# Patient Record
Sex: MALE | Race: WHITE | NOT HISPANIC OR LATINO | Employment: OTHER | URBAN - METROPOLITAN AREA
[De-identification: names, ages, dates, MRNs, and addresses within clinical notes are randomized per-mention and may not be internally consistent; named-entity substitution may affect disease eponyms.]

---

## 2018-09-10 ENCOUNTER — OFFICE VISIT (OUTPATIENT)
Dept: NEUROLOGY | Facility: CLINIC | Age: 63
End: 2018-09-10
Payer: COMMERCIAL

## 2018-09-10 VITALS
BODY MASS INDEX: 39.9 KG/M2 | DIASTOLIC BLOOD PRESSURE: 80 MMHG | RESPIRATION RATE: 20 BRPM | WEIGHT: 294.56 LBS | SYSTOLIC BLOOD PRESSURE: 166 MMHG | HEART RATE: 72 BPM | HEIGHT: 72 IN

## 2018-09-10 DIAGNOSIS — R20.2 NUMBNESS AND TINGLING OF BOTH FEET: ICD-10-CM

## 2018-09-10 DIAGNOSIS — R20.2 NUMBNESS AND TINGLING OF LEFT LEG: Primary | ICD-10-CM

## 2018-09-10 DIAGNOSIS — R20.0 NUMBNESS AND TINGLING IN BOTH HANDS: ICD-10-CM

## 2018-09-10 DIAGNOSIS — R20.2 NUMBNESS AND TINGLING IN BOTH HANDS: ICD-10-CM

## 2018-09-10 DIAGNOSIS — R20.0 NUMBNESS AND TINGLING OF BOTH FEET: ICD-10-CM

## 2018-09-10 DIAGNOSIS — R20.0 NUMBNESS AND TINGLING OF LEFT LEG: Primary | ICD-10-CM

## 2018-09-10 PROCEDURE — 99204 OFFICE O/P NEW MOD 45 MIN: CPT | Mod: S$GLB,,, | Performed by: PSYCHIATRY & NEUROLOGY

## 2018-09-10 PROCEDURE — 99999 PR PBB SHADOW E&M-NEW PATIENT-LVL III: CPT | Mod: PBBFAC,,, | Performed by: PSYCHIATRY & NEUROLOGY

## 2018-09-10 PROCEDURE — 3008F BODY MASS INDEX DOCD: CPT | Mod: CPTII,S$GLB,, | Performed by: PSYCHIATRY & NEUROLOGY

## 2018-09-10 PROCEDURE — 3077F SYST BP >= 140 MM HG: CPT | Mod: CPTII,S$GLB,, | Performed by: PSYCHIATRY & NEUROLOGY

## 2018-09-10 PROCEDURE — 3079F DIAST BP 80-89 MM HG: CPT | Mod: CPTII,S$GLB,, | Performed by: PSYCHIATRY & NEUROLOGY

## 2018-09-10 RX ORDER — FUROSEMIDE 40 MG/1
40 TABLET ORAL DAILY
Refills: 5 | COMMUNITY
Start: 2018-08-14

## 2018-09-10 RX ORDER — MELOXICAM 15 MG/1
TABLET ORAL
Refills: 3 | COMMUNITY
Start: 2018-08-22 | End: 2020-06-01

## 2018-09-10 RX ORDER — CANDESARTAN 8 MG/1
8 TABLET ORAL DAILY
Refills: 5 | COMMUNITY
Start: 2018-08-29

## 2018-09-10 RX ORDER — ALPRAZOLAM 0.5 MG/1
0.5 TABLET ORAL 2 TIMES DAILY
Refills: 2 | COMMUNITY
Start: 2018-08-06

## 2018-09-10 RX ORDER — AMOXICILLIN 500 MG/1
CAPSULE ORAL
Refills: 5 | COMMUNITY
Start: 2018-08-04 | End: 2018-09-10

## 2018-09-10 RX ORDER — DICLOFENAC SODIUM 10 MG/G
GEL TOPICAL
Refills: 5 | COMMUNITY
Start: 2018-08-14

## 2018-09-10 RX ORDER — HYDROCODONE BITARTRATE AND ACETAMINOPHEN 5; 325 MG/1; MG/1
1 TABLET ORAL EVERY 6 HOURS PRN
Refills: 0 | COMMUNITY
Start: 2018-08-23

## 2018-09-10 RX ORDER — ROSUVASTATIN CALCIUM 20 MG/1
TABLET, COATED ORAL
COMMUNITY
Start: 2012-04-11 | End: 2018-09-10

## 2018-09-10 RX ORDER — ASPIRIN 81 MG/1
81 TABLET ORAL DAILY
COMMUNITY
Start: 2012-04-11

## 2018-09-10 NOTE — PROGRESS NOTES
Consult from  ALLYSON Bowles    HPI: Killian Oleary II is a 63 y.o. male referred for numbness in the left thigh which started about 2 months ago. Symptoms are more in the anterior lateral thigh.   He has no back pain. He has this numbness with sitting or standing. This sensation is annoying and feels uncomfortable. He does not have any weakness. He wears a belt at the waist  He has also had numbness in toes for the past year at times.   He has numbness in the hands for much longer with sleep in which he feels like he has to move the hands- this is painless.  He has not gained weight this year.   He is severely obese. There is DM in his family (maternal grandmother)  His recent A1C count was 6.0.     He is seeing orthopedist for knee problems which are followed by orthopedist. Gets steroid injections with orthopedist PRN.     He does no drink alcohol and does not have a family history of neuropathy.     Review of Systems   Constitutional: Negative for fever.   HENT: Negative for nosebleeds.    Eyes: Negative for double vision.   Respiratory: Negative for hemoptysis.    Cardiovascular: Negative for leg swelling.   Gastrointestinal: Negative for blood in stool.   Genitourinary: Negative for hematuria.   Musculoskeletal: Negative for falls.   Skin: Negative for rash.   Psychiatric/Behavioral: The patient does not have insomnia.          I have reviewed all of this patient's past medical and surgical histories as well as family and social histories and active allergies and medications as documented in the electronic medical record.        Exam:  Gen Appearance, well developed/nourished in no apparent distress  CV: 2+ distal pulses with no edema or swelling  Neuro:  MS: Awake, alert, oriented to place, person, time, situation. Sustains attention. Recent/remote memory intact, Language is full to spontaneous speech/repetition/naming/comprehension. Fund of Knowledge is full  CN: Optic discs are flat with normal vasculature,  PERRL, Extraoccular movements and visual fields are full. Normal facial sensation and strength, Hearing symmetric, Tongue and Palate are midline and strong. Shoulder Shrug symmetric and strong.  Motor: Normal bulk, tone, no abnormal movements. 5/5 strength bilateral upper/lower extremities with 2+ reflexes in the arms and 1+ and no clonus  Sensory: symmetric to light touch, pain, temp, and vibration except reduced in the ankles to vibration. Romberg negative  Cerebellar: Finger-nose,Heal-shin, Rapid alternating movements intact  Gait: Normal stance, no ataxia      Labs: A1C 6.0 7/2018      Assessment/Plan: Killian Oleary II is a 63 y.o. male with 2 months of left thigh numbness. He also has one year of numbness of the bilateral toes and longer standing bilateral hand numbness at night.   I recommend:   1. EMG/NCS of the arms and legs to evaluate for Meralgia paraesthetic, polyneuropathy, and CTS  2. Stop using a belt and reduce abnormal obesity advised to see if this helps possible MP symptoms. Symptoms in the thigh are not very painful, but annoying  3. Will obtain Labs from PCP ( B12, ESR, TSH).   4. Note his A1C 6.0. He has a history of impaired fasting glucose. He is getting steroid injections in the knee currently with orthopedist, for unrelated complaint for which he is currently on disability for.   RTC for EMG/NCS  CC: ALLYSON Bowles

## 2018-09-10 NOTE — LETTER
September 10, 2018      Jonh Bowles NP  [address]  [address]  [place]  [place] [address]           [facility]  [address] [facility] [address]  Phone: [phone]  Fax: [phone]          Patient: Killian Oleary II   MR Number: [id number]   YOB: 1955   Date of Visit: 9/10/2018       Dear Jonh Bowles:    Thank you for referring Killian Oleary to me for evaluation. Attached you will find relevant portions of my assessment and plan of care.    If you have questions, please do not hesitate to call me. I look forward to following Killian Oleary along with you.    Sincerely,    Kwadwo Brown MD    Enclosure  CC:  No Recipients    If you would like to receive this communication electronically, please contact [email or web address] or [phone] to request more information on [employer] access.    For providers and/or their staff who would like to refer a patient to [facility], please contact us through our one-stop-shop provider referral line, [facility], at [phone].    If you feel you have received this communication in error or would no longer like to receive these types of communications, please e-mail [email or web address]

## 2018-09-14 ENCOUNTER — LAB VISIT (OUTPATIENT)
Dept: LAB | Facility: HOSPITAL | Age: 63
End: 2018-09-14
Attending: PSYCHIATRY & NEUROLOGY
Payer: COMMERCIAL

## 2018-09-14 ENCOUNTER — PROCEDURE VISIT (OUTPATIENT)
Dept: NEUROLOGY | Facility: CLINIC | Age: 63
End: 2018-09-14
Payer: COMMERCIAL

## 2018-09-14 DIAGNOSIS — R73.03 PREDIABETES: ICD-10-CM

## 2018-09-14 DIAGNOSIS — G62.9 POLYNEUROPATHY: Primary | ICD-10-CM

## 2018-09-14 DIAGNOSIS — R20.0 NUMBNESS AND TINGLING OF LEFT LEG: ICD-10-CM

## 2018-09-14 DIAGNOSIS — R79.89 LOW TESTOSTERONE: ICD-10-CM

## 2018-09-14 DIAGNOSIS — G62.9 POLYNEUROPATHY: ICD-10-CM

## 2018-09-14 DIAGNOSIS — R20.2 NUMBNESS AND TINGLING IN BOTH HANDS: ICD-10-CM

## 2018-09-14 DIAGNOSIS — R20.2 NUMBNESS AND TINGLING OF LEFT LEG: ICD-10-CM

## 2018-09-14 DIAGNOSIS — R20.0 NUMBNESS AND TINGLING OF BOTH FEET: ICD-10-CM

## 2018-09-14 DIAGNOSIS — G57.12 MERALGIA PARAESTHETICA, LEFT: ICD-10-CM

## 2018-09-14 DIAGNOSIS — G56.03 BILATERAL CARPAL TUNNEL SYNDROME: ICD-10-CM

## 2018-09-14 DIAGNOSIS — R20.2 NUMBNESS AND TINGLING OF BOTH FEET: ICD-10-CM

## 2018-09-14 DIAGNOSIS — R20.0 NUMBNESS AND TINGLING IN BOTH HANDS: ICD-10-CM

## 2018-09-14 LAB
ALBUMIN SERPL BCP-MCNC: 3.6 G/DL
ALP SERPL-CCNC: 61 U/L
ALT SERPL W/O P-5'-P-CCNC: 33 U/L
ANION GAP SERPL CALC-SCNC: 10 MMOL/L
AST SERPL-CCNC: 33 U/L
BASOPHILS # BLD AUTO: 0.04 K/UL
BASOPHILS NFR BLD: 0.4 %
BILIRUB SERPL-MCNC: 0.5 MG/DL
BUN SERPL-MCNC: 10 MG/DL
CALCIUM SERPL-MCNC: 9 MG/DL
CHLORIDE SERPL-SCNC: 105 MMOL/L
CO2 SERPL-SCNC: 23 MMOL/L
CREAT SERPL-MCNC: 0.8 MG/DL
DIFFERENTIAL METHOD: ABNORMAL
EOSINOPHIL # BLD AUTO: 0.3 K/UL
EOSINOPHIL NFR BLD: 3.1 %
ERYTHROCYTE [DISTWIDTH] IN BLOOD BY AUTOMATED COUNT: 13.2 %
EST. GFR  (AFRICAN AMERICAN): >60 ML/MIN/1.73 M^2
EST. GFR  (NON AFRICAN AMERICAN): >60 ML/MIN/1.73 M^2
GLUCOSE SERPL-MCNC: 131 MG/DL
HCT VFR BLD AUTO: 44.1 %
HGB BLD-MCNC: 14.6 G/DL
LYMPHOCYTES # BLD AUTO: 2.5 K/UL
LYMPHOCYTES NFR BLD: 28.4 %
MCH RBC QN AUTO: 31.4 PG
MCHC RBC AUTO-ENTMCNC: 33.1 G/DL
MCV RBC AUTO: 95 FL
MONOCYTES # BLD AUTO: 1.2 K/UL
MONOCYTES NFR BLD: 13.4 %
NEUTROPHILS # BLD AUTO: 4.9 K/UL
NEUTROPHILS NFR BLD: 54.7 %
PLATELET # BLD AUTO: 240 K/UL
PMV BLD AUTO: 10.9 FL
POTASSIUM SERPL-SCNC: 3.7 MMOL/L
PROT SERPL-MCNC: 7.7 G/DL
RBC # BLD AUTO: 4.65 M/UL
SODIUM SERPL-SCNC: 138 MMOL/L
WBC # BLD AUTO: 8.93 K/UL

## 2018-09-14 PROCEDURE — 84403 ASSAY OF TOTAL TESTOSTERONE: CPT

## 2018-09-14 PROCEDURE — 36415 COLL VENOUS BLD VENIPUNCTURE: CPT

## 2018-09-14 PROCEDURE — 95913 NRV CNDJ TEST 13/> STUDIES: CPT | Mod: S$GLB,,, | Performed by: PSYCHIATRY & NEUROLOGY

## 2018-09-14 PROCEDURE — 80053 COMPREHEN METABOLIC PANEL: CPT

## 2018-09-14 PROCEDURE — 85025 COMPLETE CBC W/AUTO DIFF WBC: CPT

## 2018-09-14 PROCEDURE — 84165 PROTEIN E-PHORESIS SERUM: CPT

## 2018-09-14 PROCEDURE — 86334 IMMUNOFIX E-PHORESIS SERUM: CPT | Mod: 26,,, | Performed by: PATHOLOGY

## 2018-09-14 PROCEDURE — 84165 PROTEIN E-PHORESIS SERUM: CPT | Mod: 26,,, | Performed by: PATHOLOGY

## 2018-09-14 PROCEDURE — 86334 IMMUNOFIX E-PHORESIS SERUM: CPT

## 2018-09-14 PROCEDURE — 95885 MUSC TST DONE W/NERV TST LIM: CPT | Mod: S$GLB,,, | Performed by: PSYCHIATRY & NEUROLOGY

## 2018-09-14 NOTE — PROCEDURES
REPORT OF EMG and NERVE CONDUCTION STUDY    Name: Killian Oleary  Date of Study:  9/14/18  Referring Physician:  Stephanie  Test Performed by:  MD Stephanie  Full Values Attached  Informed Consent Scanned.   No anesthesia used.   Amount of Blood Loss: none. The patient tolerated this procedure well.       Informed consent was obtained prior to performing this study. Two patient identifiers were confirmed with the patient prior to performing this study. A time out to determine correct patient and and agreement on procedure performed was conducted prior to the concentric needle examination.    Reason for the study:  Numb hands and feet, left thigh numbness      Findings:   Nerve conduction studies of the right median motor nerve,  bilateral median (sensory )nerves, right ulnar (motor and sensory) nerves, right  radial sensory nerve, bilateral peroneal motor, bilateral tibial motor, bilateral sural nerves and bilateral H-reflexes were performed.     Additionally, femoral cutaneous nerve of the thigh studies were performed bilaterally. Left sided study was absent. Otherwise. Amplitudes were reduced in the legs more than arms and without conduction block. Median motor distal latency was prolonged to 5.8ms on the right. Medial palm to wrist latency was prolonged to 3.3ms on the right and 2.9ms on the left. Otherwise,  distal latencies, conduction velocities, and F-waves were normal. H reflexes were absent.   EMG of selected muscles of the bilateral arms and  bilateral legs were performed as indicated on the attached sheets. T Insertional activity was normal without fasciculation or fibrillation, normal sized and phasia of motor units.      Impression:  Abnormal Study secondary to the Presence of:    1. 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 Left Megalgia Paraesthetica   3. Mild Bilateral Carpal Tunnel Syndrome    Kwadwo Brown M.D. [facility].     Recommendations (discussed  at this visit):     1. Weight loss needed for control of prediabetes. Diet and mild exercise reviewed. Check CMP, CBC, SPEP/YESSY today. Reviewed ESR, B12,Folate, and TSH from PCP. B12 is low normal: Start 1000mcg OTC B12 daily  2. Stop wearing belts reviewed again today  3. Wear wrist brace PRN CTS  4. He request testosterone lab due to remote low testosterone with prior injections used. Ordered today  5. 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

## 2018-09-14 NOTE — PATIENT INSTRUCTIONS
1. Check labs today  2. Take over the counter Vitamin B12 1000mcg daily   3. Wear a Carpal Tunnel Syndrome Brace nightly for Carpal Tunnel Syndrome  4. 15-20 pound weight loss goal in 6 months  5. Avoid wearing belts at the waist

## 2018-09-15 LAB — TESTOST SERPL-MCNC: 147 NG/DL

## 2018-09-17 ENCOUNTER — TELEPHONE (OUTPATIENT)
Dept: NEUROLOGY | Facility: CLINIC | Age: 63
End: 2018-09-17

## 2018-09-17 LAB
ALBUMIN SERPL ELPH-MCNC: 3.84 G/DL
ALPHA1 GLOB SERPL ELPH-MCNC: 0.33 G/DL
ALPHA2 GLOB SERPL ELPH-MCNC: 0.71 G/DL
B-GLOBULIN SERPL ELPH-MCNC: 1.07 G/DL
GAMMA GLOB SERPL ELPH-MCNC: 1.35 G/DL
PATHOLOGIST INTERPRETATION SPE: NORMAL
PROT SERPL-MCNC: 7.3 G/DL

## 2018-09-17 NOTE — TELEPHONE ENCOUNTER
----- Message from Rebecca Hung sent at 2018  4:19 PM CDT -----  Contact: PATIENT  Killian Oleary II  MRN: [id number]  : 1955  PCP: Jonh Bowles  Home Phone      [phone]  Work Phone      Not on file.  Mobile          [phone]      MESSAGE: Patient is calling for the results of lab work that he had done on Friday.        Phone: [phone]

## 2018-09-18 LAB
INTERPRETATION SERPL IFE-IMP: NORMAL
PATHOLOGIST INTERPRETATION IFE: NORMAL

## 2018-09-18 NOTE — TELEPHONE ENCOUNTER
Notify: Mildly low testosterone. See PCP or could considering seeing endocrinologist if PCP agrees to address.

## 2019-05-21 ENCOUNTER — OFFICE VISIT (OUTPATIENT)
Dept: NEUROLOGY | Facility: CLINIC | Age: 64
End: 2019-05-21
Payer: MEDICARE

## 2019-05-21 VITALS
HEART RATE: 70 BPM | SYSTOLIC BLOOD PRESSURE: 146 MMHG | HEIGHT: 72 IN | WEIGHT: 287.06 LBS | BODY MASS INDEX: 38.88 KG/M2 | RESPIRATION RATE: 18 BRPM | DIASTOLIC BLOOD PRESSURE: 72 MMHG

## 2019-05-21 DIAGNOSIS — G56.03 BILATERAL CARPAL TUNNEL SYNDROME: Primary | ICD-10-CM

## 2019-05-21 DIAGNOSIS — G57.12 MERALGIA PARAESTHETICA, LEFT: ICD-10-CM

## 2019-05-21 DIAGNOSIS — R73.03 PREDIABETES: ICD-10-CM

## 2019-05-21 DIAGNOSIS — G62.9 POLYNEUROPATHY: ICD-10-CM

## 2019-05-21 PROCEDURE — 3078F PR MOST RECENT DIASTOLIC BLOOD PRESSURE < 80 MM HG: ICD-10-PCS | Mod: CPTII,S$GLB,, | Performed by: PSYCHIATRY & NEUROLOGY

## 2019-05-21 PROCEDURE — 99999 PR PBB SHADOW E&M-EST. PATIENT-LVL III: ICD-10-PCS | Mod: PBBFAC,,, | Performed by: PSYCHIATRY & NEUROLOGY

## 2019-05-21 PROCEDURE — 99214 OFFICE O/P EST MOD 30 MIN: CPT | Mod: S$GLB,,, | Performed by: PSYCHIATRY & NEUROLOGY

## 2019-05-21 PROCEDURE — 3077F PR MOST RECENT SYSTOLIC BLOOD PRESSURE >= 140 MM HG: ICD-10-PCS | Mod: CPTII,S$GLB,, | Performed by: PSYCHIATRY & NEUROLOGY

## 2019-05-21 PROCEDURE — 3077F SYST BP >= 140 MM HG: CPT | Mod: CPTII,S$GLB,, | Performed by: PSYCHIATRY & NEUROLOGY

## 2019-05-21 PROCEDURE — 3008F PR BODY MASS INDEX (BMI) DOCUMENTED: ICD-10-PCS | Mod: CPTII,S$GLB,, | Performed by: PSYCHIATRY & NEUROLOGY

## 2019-05-21 PROCEDURE — 3008F BODY MASS INDEX DOCD: CPT | Mod: CPTII,S$GLB,, | Performed by: PSYCHIATRY & NEUROLOGY

## 2019-05-21 PROCEDURE — 3078F DIAST BP <80 MM HG: CPT | Mod: CPTII,S$GLB,, | Performed by: PSYCHIATRY & NEUROLOGY

## 2019-05-21 PROCEDURE — 99999 PR PBB SHADOW E&M-EST. PATIENT-LVL III: CPT | Mod: PBBFAC,,, | Performed by: PSYCHIATRY & NEUROLOGY

## 2019-05-21 PROCEDURE — 99214 PR OFFICE/OUTPT VISIT, EST, LEVL IV, 30-39 MIN: ICD-10-PCS | Mod: S$GLB,,, | Performed by: PSYCHIATRY & NEUROLOGY

## 2019-05-21 RX ORDER — GABAPENTIN 100 MG/1
CAPSULE ORAL
Qty: 90 CAPSULE | Refills: 12 | Status: SHIPPED | OUTPATIENT
Start: 2019-05-21 | End: 2020-05-27

## 2019-05-21 NOTE — PROGRESS NOTES
HPI:  Killian Oleary II is a 64 y.o. male with  left thigh numbness. He also has longer history of numbness of the bilateral toes and longer standing bilateral hand numbness at night.     Since the last visit, the patient has been trying to loose weight.    Thigh numbness continues.  Still wearing belts but increased size.  He continues with numbness in the toes   CTS symptoms are persistent but still has symptoms despite brace    He is pending repeat sleep study with PCP.     He is taking B12 currently.  He would consider a medication for thigh pain/numbness.     Last A1C done in March by PCP and this was normal still    Still not working    Low T labs to PCP- he is on tesosterone per list- suggested patient clarify this with PCP reviewed .     Review of Systems   Constitutional: Negative for fever.   HENT: Negative for nosebleeds.    Eyes: Negative for double vision.   Respiratory: Negative for hemoptysis.    Cardiovascular: Negative for leg swelling.   Gastrointestinal: Negative for blood in stool.   Genitourinary: Negative for hematuria.   Musculoskeletal: Negative for falls.   Skin: Negative for rash.   Psychiatric/Behavioral: The patient does not have insomnia.          I have reviewed all of this patient's past medical and surgical histories as well as family and social histories and active allergies and medications as documented in the electronic medical record.        Exam:  Gen Appearance, well developed/nourished in no apparent distress  CV: 2+ distal pulses with no edema or swelling  Neuro:  MS: Awake, alert, oriented to place, person, time, situation. Sustains attention. Recent/remote memory intact, Language is full to spontaneous speech/repetition/naming/comprehension. Fund of Knowledge is full  CN: Optic discs are flat with normal vasculature, PERRL, Extraoccular movements and visual fields are full. Normal facial sensation and strength, Hearing symmetric, Tongue and Palate are midline and strong.  Shoulder Shrug symmetric and strong.  Motor: Normal bulk, tone, no abnormal movements. 5/5 strength bilateral upper/lower extremities with 2+ reflexes in the arms and 1+ and no clonus  Sensory: symmetric to light touch, pain, temp, and vibration except reduced in the ankles to vibration. Romberg negative  Cerebellar: Finger-nose,Heal-shin, Rapid alternating movements intact  Gait: Normal stance, no ataxia      Labs: A1C 6.0 7/2018   Reviewed ESR, B12,Folate, and TSH from PCP in 2018. B12 was low normal   2018  CMP, CBC, SPEP/YESSY  Showed fasting glucose 131  Low T lab sent to PCP for review with patient    EMG 9/2018: Impression:  Abnormal Study secondary to the Presence of:    1. 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 Left Megalgia Paraesthetica   3. Mild Bilateral Carpal Tunnel Syndrome       Assessment/Plan: Killian Oleary II is a 64 y.o. male with  left thigh numbness. He also has longer history of numbness of the bilateral toes and longer standing bilateral hand numbness at night. 2018 EMG showed sensory and motor axonal polyneuropathy in the feet more than hands,  Left Megalgia Paraesthetica,   And Mild Bilateral Carpal Tunnel Syndrome  I recommend:     1. Weight loss needed for control of prediabetes/ impaired fasting glucose. 2018 A1C was 6.0 and PCP continues to follow this- no DM to date per patient.   - Diet and mild exercise reviewed.   2. B12 is low normal: Continue 1000mcg OTC B12 daily and follow B12 levels over time  3. Stop wearing belts reviewed again today  -Add low dose gabapentin per orders for thigh numbness/ pain Unless sedation, mood changes or other side effects  4. 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   6. Low T labs to PCP- he is on tesosterone per list- suggested patient clarify this with PCP reviewed .   RTC  6 months

## 2019-05-22 ENCOUNTER — TELEPHONE (OUTPATIENT)
Dept: NEUROLOGY | Facility: CLINIC | Age: 64
End: 2019-05-22

## 2019-05-22 NOTE — TELEPHONE ENCOUNTER
Patient states that he is having problems getting a new sleep study due to an insurance change. Initially, his PCP was to order the study but insurance changed to [employer] so he is asking if you could order the sleep study. He stated that this was discussed at his OV on 5/21/19. Please advise.

## 2019-05-22 NOTE — TELEPHONE ENCOUNTER
----- Message from Gala Mahoney MA sent at 2019  1:39 PM CDT -----  Contact: Patient  Killian Oleary II  MRN: [id number]  : 1955  PCP: Jonh Bowles  Home Phone      [phone]  Work Phone      Not on file.  Mobile          [phone]      MESSAGE:  Patient is requesting to speak to you in reference to orders for sleep study. He can be reached at [phone].

## 2019-05-23 NOTE — TELEPHONE ENCOUNTER
He needs to get back with his PCP to order again as they would have any info needed for the forms and questionnaires for the study.

## 2019-05-30 ENCOUNTER — HOSPITAL ENCOUNTER (OUTPATIENT)
Dept: SLEEP MEDICINE | Facility: HOSPITAL | Age: 64
Discharge: HOME | End: 2019-05-30
Attending: NURSE PRACTITIONER
Payer: MEDICARE

## 2019-05-30 DIAGNOSIS — G47.33 OBSTRUCTIVE SLEEP APNEA (ADULT) (PEDIATRIC): ICD-10-CM

## 2019-05-30 PROCEDURE — 95806 SLEEP STUDY UNATT&RESP EFFT: CPT

## 2019-06-05 ENCOUNTER — HOSPITAL ENCOUNTER (OUTPATIENT)
Dept: RADIOLOGY | Facility: HOSPITAL | Age: 64
Discharge: HOME | End: 2019-06-05
Attending: ORTHOPAEDIC SURGERY
Payer: MEDICARE

## 2019-06-05 DIAGNOSIS — M79.641 RIGHT HAND PAIN: ICD-10-CM

## 2019-06-05 DIAGNOSIS — M79.642 LEFT HAND PAIN: ICD-10-CM

## 2019-06-05 DIAGNOSIS — M79.642 BILATERAL HAND PAIN: ICD-10-CM

## 2019-06-05 DIAGNOSIS — M79.641 RIGHT HAND PAIN: Primary | ICD-10-CM

## 2019-06-05 DIAGNOSIS — M79.641 BILATERAL HAND PAIN: ICD-10-CM

## 2019-06-05 PROCEDURE — 73130 X-RAY EXAM OF HAND: CPT | Mod: 50,TC

## 2019-06-17 ENCOUNTER — HOSPITAL ENCOUNTER (OUTPATIENT)
Dept: RADIOLOGY | Facility: HOSPITAL | Age: 64
Discharge: HOME | End: 2019-06-17
Attending: INTERNAL MEDICINE
Payer: MEDICARE

## 2019-06-17 ENCOUNTER — HOSPITAL ENCOUNTER (OUTPATIENT)
Dept: PULMONOLOGY | Facility: HOSPITAL | Age: 64
Discharge: HOME | End: 2019-06-17
Attending: INTERNAL MEDICINE
Payer: MEDICARE

## 2019-06-17 DIAGNOSIS — J44.9 CHRONIC OBSTRUCTIVE PULMONARY DISEASE, UNSPECIFIED COPD TYPE: ICD-10-CM

## 2019-06-17 DIAGNOSIS — J44.9 CHRONIC OBSTRUCTIVE PULMONARY DISEASE, UNSPECIFIED COPD TYPE: Primary | ICD-10-CM

## 2019-06-17 DIAGNOSIS — Z78.9 CURRENT NON-SMOKER BUT PAST SMOKING HISTORY UNKNOWN: ICD-10-CM

## 2019-06-17 DIAGNOSIS — G47.33 OBSTRUCTIVE SLEEP APNEA SYNDROME: ICD-10-CM

## 2019-06-17 PROCEDURE — 71046 XR CHEST PA AND LATERAL: ICD-10-PCS | Mod: 26,,, | Performed by: RADIOLOGY

## 2019-06-17 PROCEDURE — 94729 DIFFUSING CAPACITY: CPT

## 2019-06-17 PROCEDURE — 99900031 HC PATIENT EDUCATION (STAT)

## 2019-06-17 PROCEDURE — 71046 X-RAY EXAM CHEST 2 VIEWS: CPT | Mod: 26,,, | Performed by: RADIOLOGY

## 2019-06-17 PROCEDURE — 94727 GAS DIL/WSHOT DETER LNG VOL: CPT

## 2019-06-17 PROCEDURE — 94060 EVALUATION OF WHEEZING: CPT

## 2019-06-17 PROCEDURE — 71046 X-RAY EXAM CHEST 2 VIEWS: CPT | Mod: TC

## 2019-06-18 ENCOUNTER — HOSPITAL ENCOUNTER (OUTPATIENT)
Dept: SLEEP MEDICINE | Facility: HOSPITAL | Age: 64
Discharge: HOME | End: 2019-06-18
Attending: NURSE PRACTITIONER
Payer: MEDICARE

## 2019-06-18 DIAGNOSIS — G47.33 OBSTRUCTIVE SLEEP APNEA (ADULT) (PEDIATRIC): ICD-10-CM

## 2019-06-18 PROCEDURE — 95811 POLYSOM 6/>YRS CPAP 4/> PARM: CPT

## 2019-06-25 DIAGNOSIS — G47.33 OBSTRUCTIVE SLEEP APNEA SYNDROME: Primary | ICD-10-CM

## 2019-11-26 ENCOUNTER — OFFICE VISIT (OUTPATIENT)
Dept: NEUROLOGY | Facility: CLINIC | Age: 64
End: 2019-11-26
Payer: MEDICARE

## 2019-11-26 VITALS
BODY MASS INDEX: 39.86 KG/M2 | WEIGHT: 294.31 LBS | RESPIRATION RATE: 16 BRPM | DIASTOLIC BLOOD PRESSURE: 76 MMHG | HEIGHT: 72 IN | HEART RATE: 85 BPM | SYSTOLIC BLOOD PRESSURE: 150 MMHG

## 2019-11-26 DIAGNOSIS — R73.03 PREDIABETES: Primary | ICD-10-CM

## 2019-11-26 DIAGNOSIS — G57.12 MERALGIA PARAESTHETICA, LEFT: ICD-10-CM

## 2019-11-26 DIAGNOSIS — G47.33 OBSTRUCTIVE SLEEP APNEA (ADULT) (PEDIATRIC): ICD-10-CM

## 2019-11-26 DIAGNOSIS — G56.03 BILATERAL CARPAL TUNNEL SYNDROME: ICD-10-CM

## 2019-11-26 DIAGNOSIS — G62.9 POLYNEUROPATHY: ICD-10-CM

## 2019-11-26 DIAGNOSIS — E53.8 B12 DEFICIENCY: ICD-10-CM

## 2019-11-26 DIAGNOSIS — R79.89 LOW TESTOSTERONE: ICD-10-CM

## 2019-11-26 PROCEDURE — 3078F DIAST BP <80 MM HG: CPT | Mod: CPTII,S$GLB,, | Performed by: PSYCHIATRY & NEUROLOGY

## 2019-11-26 PROCEDURE — 99999 PR PBB SHADOW E&M-EST. PATIENT-LVL III: CPT | Mod: PBBFAC,,, | Performed by: PSYCHIATRY & NEUROLOGY

## 2019-11-26 PROCEDURE — 99214 PR OFFICE/OUTPT VISIT, EST, LEVL IV, 30-39 MIN: ICD-10-PCS | Mod: S$GLB,,, | Performed by: PSYCHIATRY & NEUROLOGY

## 2019-11-26 PROCEDURE — 99999 PR PBB SHADOW E&M-EST. PATIENT-LVL III: ICD-10-PCS | Mod: PBBFAC,,, | Performed by: PSYCHIATRY & NEUROLOGY

## 2019-11-26 PROCEDURE — 3008F PR BODY MASS INDEX (BMI) DOCUMENTED: ICD-10-PCS | Mod: CPTII,S$GLB,, | Performed by: PSYCHIATRY & NEUROLOGY

## 2019-11-26 PROCEDURE — 99214 OFFICE O/P EST MOD 30 MIN: CPT | Mod: S$GLB,,, | Performed by: PSYCHIATRY & NEUROLOGY

## 2019-11-26 PROCEDURE — 3077F SYST BP >= 140 MM HG: CPT | Mod: CPTII,S$GLB,, | Performed by: PSYCHIATRY & NEUROLOGY

## 2019-11-26 PROCEDURE — 3008F BODY MASS INDEX DOCD: CPT | Mod: CPTII,S$GLB,, | Performed by: PSYCHIATRY & NEUROLOGY

## 2019-11-26 PROCEDURE — 3078F PR MOST RECENT DIASTOLIC BLOOD PRESSURE < 80 MM HG: ICD-10-PCS | Mod: CPTII,S$GLB,, | Performed by: PSYCHIATRY & NEUROLOGY

## 2019-11-26 PROCEDURE — 3077F PR MOST RECENT SYSTOLIC BLOOD PRESSURE >= 140 MM HG: ICD-10-PCS | Mod: CPTII,S$GLB,, | Performed by: PSYCHIATRY & NEUROLOGY

## 2019-11-26 NOTE — PROGRESS NOTES
HPI:  Killian Oleary II is a 64 y.o. male with  left thigh numbness. He also has longer history of numbness of the bilateral toes and longer standing bilateral hand numbness at night.     Since the last visit, the patient started gabapentin and symptoms are greatly improved      Thigh numbness is improved Pain  resolved.     He continues with numbness in the toes at times but improved      CTS symptoms are persistent but still has symptoms despite brace    He had  repeat sleep study. Seeing Dr Vargas then Dr Schaefer at [facility] for CARLOS and COPD and pain in the chest (had CT scan).     He is taking B12 currently.    Weight is up since the last visit. Fasting labs followed by PCP. He is exercising    Saw ortho about CTS and was advised to avoid surgery per him    He states his testosterone level is low. He needs testosterone per him and is waiting for Testosterone injections      Review of Systems   Constitutional: Negative for fever.   HENT: Negative for nosebleeds.    Eyes: Negative for double vision.   Respiratory: Negative for hemoptysis.    Cardiovascular: Negative for leg swelling.   Genitourinary: Negative for hematuria.   Musculoskeletal: Negative for falls.   Skin: Negative for rash.   Neurological: Positive for sensory change.   Endo/Heme/Allergies: Does not bruise/bleed easily.   Psychiatric/Behavioral: The patient does not have insomnia.          I have reviewed all of this patient's past medical and surgical histories as well as family and social histories and active allergies and medications as documented in the electronic medical record.        Exam:  Gen Appearance, well developed/nourished in no apparent distress  CV: 2+ distal pulses with no edema or swelling  Neuro:  MS: Awake, alert, oriented to place, person, time, situation. Sustains attention. Recent/remote memory intact, Language is full to spontaneous speech/repetition/naming/comprehension. Fund of Knowledge is full  CN: Optic discs are flat with  normal vasculature, PERRL, Extraoccular movements and visual fields are full. Normal facial sensation and strength, Hearing symmetric, Tongue and Palate are midline and strong. Shoulder Shrug symmetric and strong.  Motor: Normal bulk, tone, no abnormal movements. 5/5 strength bilateral upper/lower extremities with 2+ reflexes in the arms and 1+ and no clonus  Sensory: symmetric to light touch, pain, temp, and vibration except reduced in the ankles to vibration. Romberg negative  Cerebellar: Finger-nose,Heal-shin, Rapid alternating movements intact  Gait: Normal stance, no ataxia      Labs: A1C 6.0 7/2018   Reviewed ESR, B12,Folate, and TSH from PCP in 2018. B12 was low normal   2018  CMP, CBC, SPEP/YESSY  Showed fasting glucose 131  Low T lab sent to PCP for review with patient    EMG 9/2018: Impression:  Abnormal Study secondary to the Presence of:    1. 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 Left Megalgia Paraesthetica   3. Mild Bilateral Carpal Tunnel Syndrome       Assessment/Plan: Killian Oleary II is a 64 y.o. male with  left thigh numbness. He also has longer history of numbness of the bilateral toes and longer standing bilateral hand numbness at night. 2018 EMG showed sensory and motor axonal polyneuropathy in the feet more than hands,  Left Megalgia Paraesthetica,   And Mild Bilateral Carpal Tunnel Syndrome  I recommend:     1. Weight loss needed for control of prediabetes/ impaired fasting glucose. 2018 A1C was 6.0 and PCP continues to follow this- no DM to date per patient.   - Diet and mild exercise reviewed.   2. B12 is low normal: Continue 1000mcg OTC B12 daily and follow B12 levels oat the next visit  3. Stop wearing belts reviewed again today  -gabapentin for thigh numbness/ pain is very helpful for neuropathy and thigh numbness  4. 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   6. CARLOS treated by  Pulmonology along with COPD  7. Patient is pending Testosterone injections via PCP for low T  RTC  6 months

## 2020-05-28 RX ORDER — GABAPENTIN 100 MG/1
100 CAPSULE ORAL 3 TIMES DAILY
Qty: 90 CAPSULE | Refills: 12 | Status: SHIPPED | OUTPATIENT
Start: 2020-05-28 | End: 2020-12-01

## 2020-06-01 ENCOUNTER — OFFICE VISIT (OUTPATIENT)
Dept: NEUROLOGY | Facility: CLINIC | Age: 65
End: 2020-06-01
Payer: MEDICARE

## 2020-06-01 VITALS
HEIGHT: 72 IN | RESPIRATION RATE: 18 BRPM | HEART RATE: 88 BPM | SYSTOLIC BLOOD PRESSURE: 146 MMHG | DIASTOLIC BLOOD PRESSURE: 76 MMHG | WEIGHT: 296.75 LBS | OXYGEN SATURATION: 97 % | BODY MASS INDEX: 40.19 KG/M2

## 2020-06-01 DIAGNOSIS — G57.12 MERALGIA PARAESTHETICA, LEFT: ICD-10-CM

## 2020-06-01 DIAGNOSIS — G62.9 POLYNEUROPATHY: Primary | ICD-10-CM

## 2020-06-01 DIAGNOSIS — G56.03 BILATERAL CARPAL TUNNEL SYNDROME: ICD-10-CM

## 2020-06-01 PROCEDURE — 3078F DIAST BP <80 MM HG: CPT | Mod: CPTII,S$GLB,, | Performed by: PSYCHIATRY & NEUROLOGY

## 2020-06-01 PROCEDURE — 99999 PR PBB SHADOW E&M-EST. PATIENT-LVL III: ICD-10-PCS | Mod: PBBFAC,,, | Performed by: PSYCHIATRY & NEUROLOGY

## 2020-06-01 PROCEDURE — 99214 PR OFFICE/OUTPT VISIT, EST, LEVL IV, 30-39 MIN: ICD-10-PCS | Mod: S$GLB,,, | Performed by: PSYCHIATRY & NEUROLOGY

## 2020-06-01 PROCEDURE — 99999 PR PBB SHADOW E&M-EST. PATIENT-LVL III: CPT | Mod: PBBFAC,,, | Performed by: PSYCHIATRY & NEUROLOGY

## 2020-06-01 PROCEDURE — 3077F SYST BP >= 140 MM HG: CPT | Mod: CPTII,S$GLB,, | Performed by: PSYCHIATRY & NEUROLOGY

## 2020-06-01 PROCEDURE — 3008F PR BODY MASS INDEX (BMI) DOCUMENTED: ICD-10-PCS | Mod: CPTII,S$GLB,, | Performed by: PSYCHIATRY & NEUROLOGY

## 2020-06-01 PROCEDURE — 3077F PR MOST RECENT SYSTOLIC BLOOD PRESSURE >= 140 MM HG: ICD-10-PCS | Mod: CPTII,S$GLB,, | Performed by: PSYCHIATRY & NEUROLOGY

## 2020-06-01 PROCEDURE — 3078F PR MOST RECENT DIASTOLIC BLOOD PRESSURE < 80 MM HG: ICD-10-PCS | Mod: CPTII,S$GLB,, | Performed by: PSYCHIATRY & NEUROLOGY

## 2020-06-01 PROCEDURE — 99214 OFFICE O/P EST MOD 30 MIN: CPT | Mod: S$GLB,,, | Performed by: PSYCHIATRY & NEUROLOGY

## 2020-06-01 PROCEDURE — 3008F BODY MASS INDEX DOCD: CPT | Mod: CPTII,S$GLB,, | Performed by: PSYCHIATRY & NEUROLOGY

## 2020-06-01 RX ORDER — FLUTICASONE FUROATE AND VILANTEROL 100; 25 UG/1; UG/1
1 POWDER RESPIRATORY (INHALATION) DAILY
COMMUNITY

## 2020-06-01 RX ORDER — ALBUTEROL SULFATE 90 UG/1
2 AEROSOL, METERED RESPIRATORY (INHALATION) EVERY 6 HOURS PRN
COMMUNITY
End: 2022-03-08

## 2020-06-01 RX ORDER — DOXYCYCLINE 100 MG/1
100 CAPSULE ORAL 2 TIMES DAILY PRN
COMMUNITY
End: 2022-03-08

## 2020-06-01 RX ORDER — LANOLIN ALCOHOL/MO/W.PET/CERES
100 CREAM (GRAM) TOPICAL DAILY
COMMUNITY

## 2020-06-01 RX ORDER — ACETAMINOPHEN 500 MG
1 TABLET ORAL DAILY
COMMUNITY

## 2020-06-01 NOTE — PROGRESS NOTES
HPI:  Killian Oleary II is a 65 y.o. male with  left thigh numbness. He also has longer history of numbness of the bilateral toes and longer standing bilateral hand numbness at night.     Here for routine follow up  Concerned about possible rash side effect with gabapentin.   He has been seeing PCP.   Rash started episodically since being on Gabapentin  He also has weight gain, irritable mood with the medications  No pain, no numbness in the toes.  Thigh numbness still resolved  CTS symptoms are doing well with brace wear      He is taking B12 currently.    Weight up 2 pounds    Taking Gapapentin 100mg TID  Ortho still injecting knee   Testosterone is used with PCP      Review of Systems   Constitutional: Negative for fever.   HENT: Negative for nosebleeds.    Eyes: Negative for double vision.   Respiratory: Negative for hemoptysis.    Cardiovascular: Negative for leg swelling.   Genitourinary: Negative for hematuria.   Musculoskeletal: Negative for falls.   Skin: Positive for rash.        Reports rash on back   Neurological: Positive for sensory change.   Endo/Heme/Allergies: Does not bruise/bleed easily.   Psychiatric/Behavioral: The patient does not have insomnia.          I have reviewed all of this patient's past medical and surgical histories as well as family and social histories and active allergies and medications as documented in the electronic medical record.        Exam:  Gen Appearance, well developed/nourished in no apparent distress  CV: 2+ distal pulses with no edema or swelling  Neuro:  MS: Awake, alert, oriented to place, person, time, situation. Sustains attention. Recent/remote memory intact, Language is full to spontaneous speech/repetition/naming/comprehension. Fund of Knowledge is full  CN: Optic discs are flat with normal vasculature, PERRL, Extraoccular movements and visual fields are full. Normal facial sensation and strength, Hearing symmetric, Tongue and Palate are midline and strong.  Shoulder Shrug symmetric and strong.  Motor: Normal bulk, tone, no abnormal movements. 5/5 strength bilateral upper/lower extremities with 2+ reflexes in the arms and 1+ and no clonus  Sensory: symmetric to light touch, pain, temp, and vibration except reduced in the ankles to vibration. Romberg negative  Cerebellar: Finger-nose,Heal-shin, Rapid alternating movements intact  Gait: Normal stance, no ataxia      Labs: A1C 6.0 7/2018   Reviewed ESR, B12,Folate, and TSH from PCP in 2018. B12 was low normal   2018  CMP, CBC, SPEP/YESSY  Showed fasting glucose 131  Low T lab sent to PCP for review with patient    EMG 9/2018: Impression:  Abnormal Study secondary to the Presence of:    1. 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 Left Megalgia Paraesthetica   3. Mild Bilateral Carpal Tunnel Syndrome       Assessment/Plan: Killian Oleary II is a 65 y.o. male with  left thigh numbness. He also has longer history of numbness of the bilateral toes and longer standing bilateral hand numbness at night. 2018 EMG showed sensory and motor axonal polyneuropathy in the feet more than hands,  Left Megalgia Paraesthetica,   And Mild Bilateral Carpal Tunnel Syndrome  I recommend:     1. Weight loss needed for control of prediabetes/ impaired fasting glucose. 2018 A1C was 6.0 and PCP continues to follow this- no DM to date per patient.   - Diet and mild exercise reviewed prior  2. B12 is low normal: Continue 1000mcg OTC B12 daily and follow-up B12 level at the next visit (post Covid pandemic)  3. Stop wearing belts reviewed prior  -gabapentin for thigh numbness/ pain was very helpful for neuropathy and thigh numbness but he has weight gain and possible rash/ mood side effects  -Taper Gabapentin to one BID for a week, then one nightly for a week then stop. See Dermatologist  if rash is no improved after that  -Could consider neuropathic cream if pain continues off this medication. He will notify me if so  4.  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   6. CARLOS treated by  Pulmonology along with COPD    RTC  6 months

## 2020-12-01 ENCOUNTER — OFFICE VISIT (OUTPATIENT)
Dept: NEUROLOGY | Facility: CLINIC | Age: 65
End: 2020-12-01
Payer: MEDICARE

## 2020-12-01 VITALS
BODY MASS INDEX: 39.42 KG/M2 | HEIGHT: 72 IN | DIASTOLIC BLOOD PRESSURE: 82 MMHG | SYSTOLIC BLOOD PRESSURE: 132 MMHG | WEIGHT: 291 LBS | RESPIRATION RATE: 16 BRPM | TEMPERATURE: 98 F | HEART RATE: 83 BPM

## 2020-12-01 DIAGNOSIS — R73.03 PREDIABETES: ICD-10-CM

## 2020-12-01 DIAGNOSIS — G56.03 BILATERAL CARPAL TUNNEL SYNDROME: ICD-10-CM

## 2020-12-01 DIAGNOSIS — E53.8 B12 DEFICIENCY: ICD-10-CM

## 2020-12-01 DIAGNOSIS — E66.01 SEVERE OBESITY (BMI 35.0-39.9) WITH COMORBIDITY: ICD-10-CM

## 2020-12-01 DIAGNOSIS — G62.9 POLYNEUROPATHY: Primary | ICD-10-CM

## 2020-12-01 DIAGNOSIS — G57.12 MERALGIA PARAESTHETICA, LEFT: ICD-10-CM

## 2020-12-01 PROCEDURE — 3079F PR MOST RECENT DIASTOLIC BLOOD PRESSURE 80-89 MM HG: ICD-10-PCS | Mod: CPTII,S$GLB,, | Performed by: PSYCHIATRY & NEUROLOGY

## 2020-12-01 PROCEDURE — 3075F PR MOST RECENT SYSTOLIC BLOOD PRESS GE 130-139MM HG: ICD-10-PCS | Mod: CPTII,S$GLB,, | Performed by: PSYCHIATRY & NEUROLOGY

## 2020-12-01 PROCEDURE — 99999 PR PBB SHADOW E&M-EST. PATIENT-LVL IV: CPT | Mod: PBBFAC,,, | Performed by: PSYCHIATRY & NEUROLOGY

## 2020-12-01 PROCEDURE — 99999 PR PBB SHADOW E&M-EST. PATIENT-LVL IV: ICD-10-PCS | Mod: PBBFAC,,, | Performed by: PSYCHIATRY & NEUROLOGY

## 2020-12-01 PROCEDURE — 3079F DIAST BP 80-89 MM HG: CPT | Mod: CPTII,S$GLB,, | Performed by: PSYCHIATRY & NEUROLOGY

## 2020-12-01 PROCEDURE — 99214 OFFICE O/P EST MOD 30 MIN: CPT | Mod: S$GLB,,, | Performed by: PSYCHIATRY & NEUROLOGY

## 2020-12-01 PROCEDURE — 3008F BODY MASS INDEX DOCD: CPT | Mod: CPTII,S$GLB,, | Performed by: PSYCHIATRY & NEUROLOGY

## 2020-12-01 PROCEDURE — 1125F PR PAIN SEVERITY QUANTIFIED, PAIN PRESENT: ICD-10-PCS | Mod: S$GLB,,, | Performed by: PSYCHIATRY & NEUROLOGY

## 2020-12-01 PROCEDURE — 3008F PR BODY MASS INDEX (BMI) DOCUMENTED: ICD-10-PCS | Mod: CPTII,S$GLB,, | Performed by: PSYCHIATRY & NEUROLOGY

## 2020-12-01 PROCEDURE — 99214 PR OFFICE/OUTPT VISIT, EST, LEVL IV, 30-39 MIN: ICD-10-PCS | Mod: S$GLB,,, | Performed by: PSYCHIATRY & NEUROLOGY

## 2020-12-01 PROCEDURE — 3075F SYST BP GE 130 - 139MM HG: CPT | Mod: CPTII,S$GLB,, | Performed by: PSYCHIATRY & NEUROLOGY

## 2020-12-01 PROCEDURE — 1125F AMNT PAIN NOTED PAIN PRSNT: CPT | Mod: S$GLB,,, | Performed by: PSYCHIATRY & NEUROLOGY

## 2020-12-01 RX ORDER — ESOMEPRAZOLE MAGNESIUM 40 MG/1
CAPSULE, DELAYED RELEASE ORAL
COMMUNITY

## 2020-12-01 RX ORDER — KETOCONAZOLE 20 MG/G
CREAM TOPICAL
COMMUNITY
Start: 2020-11-05

## 2020-12-01 RX ORDER — FENOPROFEN CALCIUM 600 MG/1
TABLET, FILM COATED ORAL
COMMUNITY
End: 2020-12-01

## 2020-12-01 RX ORDER — CYCLOBENZAPRINE HCL 5 MG
TABLET ORAL
COMMUNITY

## 2020-12-01 RX ORDER — TESTOSTERONE CYPIONATE 200 MG/ML
INJECTION, SOLUTION INTRAMUSCULAR
COMMUNITY
Start: 2020-11-24 | End: 2020-12-01 | Stop reason: SDUPTHER

## 2020-12-01 NOTE — PROGRESS NOTES
HPI:  Killian Oleary II is a 65 y.o. male with  left thigh numbness. He also has longer history of numbness of the bilateral toes and longer standing bilateral hand numbness at night.     Patient is here for 6 months follow up      Off gabapentin now  Rash is gone  Weight is down 19 pounds  Mood is better    Still no pain or numbness in the toes  Thigh numbness is still resolved  Still no DM2 per PCP's labs  Wearing brace for CTS PRN but this is worse off gabapentin but not daily and worse with certain activities. Using brace nightly now    Continue B12      Ortho still injecting knee PRN  Testosterone is used with PCP  Still seeing pulmonology for COPD/CARLOS    Enjoying his camp in .       Review of Systems   Constitutional: Negative for fever.   HENT: Negative for nosebleeds.    Eyes: Negative for double vision.   Respiratory: Negative for hemoptysis.    Cardiovascular: Negative for leg swelling.   Genitourinary: Negative for hematuria.   Musculoskeletal: Negative for falls.   Skin: Negative for rash.   Neurological: Positive for sensory change.   Endo/Heme/Allergies: Does not bruise/bleed easily.   Psychiatric/Behavioral: The patient does not have insomnia.          I have reviewed all of this patient's past medical and surgical histories as well as family and social histories and active allergies and medications as documented in the electronic medical record.        Exam:  Gen Appearance, well developed/nourished in no apparent distress  CV: 2+ distal pulses with no edema or swelling  Neuro:  MS: Awake, alert, oriented to place, person, time, situation. Sustains attention. Recent/remote memory intact, Language is full to spontaneous speech/repetition/naming/comprehension. Fund of Knowledge is full  CN: Optic discs are flat with normal vasculature, PERRL, Extraoccular movements and visual fields are full. Normal facial sensation and strength, Hearing symmetric, Tongue and Palate are midline and strong. Shoulder  Shrug symmetric and strong.  Motor: Normal bulk, tone, no abnormal movements. 5/5 strength bilateral upper/lower extremities with 2+ reflexes in the arms and 1+ and no clonus  Sensory: symmetric to light touch, pain, temp, and vibration except reduced in the ankles to vibration. Romberg negative  Cerebellar: Finger-nose,Heal-shin, Rapid alternating movements intact  Gait: Normal stance, no ataxia      Labs: A1C 6.0 7/2018   Reviewed ESR, B12,Folate, and TSH from PCP in 2018. B12 was low normal   2018  CMP, CBC, SPEP/YESSY  Showed fasting glucose 131  Low T lab sent to PCP for review with patient    EMG 9/2018: Impression:  Abnormal Study secondary to the Presence of:    1. 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 Left Megalgia Paraesthetica   3. Mild Bilateral Carpal Tunnel Syndrome       Assessment/Plan: Killian Oleary II is a 65 y.o. male with  left thigh numbness. He also has longer history of numbness of the bilateral toes and longer standing bilateral hand numbness at night. 2018 EMG showed sensory and motor axonal polyneuropathy in the feet more than hands,  Left Megalgia Paraesthetica,   And Mild Bilateral Carpal Tunnel Syndrome  I recommend:     1. Continued Weight loss/ reduction of severe obesity needed for control of prediabetes/ impaired fasting glucose. 2018 A1C was 6.0 and PCP continues to follow this- no DM to date per patient.   - Diet and mild exercise reviewed prior. He is loosing weight  2. B12 was low normal: Continue 1000mcg OTC B12 daily and follow-up B12 level at next visit  3. Stop wearing belts reviewed prior  -gabapentin for thigh numbness/ pain was very helpful for neuropathy and thigh numbness but he had weight gain and possible rash/ mood side effects with this  -Tapered Gabapentin and no worsening polyneuropathy or MP symptoms  -Could consider neuropathic cream if pain worsens off this medication. He will notify me if so  4. Referred to Orthopedist for  continued CTS symptoms despite brace wearing but was told to avoid surgery(states he was advised to have knee surgery instead- having injections PRN)  -Some CTS symptoms are worse off of gabapentin and with increased activities. 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   6. CARLOS treated by  Pulmonology along with COPD    RTC  6 months

## 2021-01-20 ENCOUNTER — TELEPHONE (OUTPATIENT)
Dept: NEUROLOGY | Facility: CLINIC | Age: 66
End: 2021-01-20

## 2022-03-08 ENCOUNTER — OFFICE VISIT (OUTPATIENT)
Dept: NEUROLOGY | Facility: CLINIC | Age: 67
End: 2022-03-08
Payer: MEDICARE

## 2022-03-08 ENCOUNTER — LAB VISIT (OUTPATIENT)
Dept: LAB | Facility: HOSPITAL | Age: 67
End: 2022-03-08
Attending: PSYCHIATRY & NEUROLOGY
Payer: MEDICARE

## 2022-03-08 VITALS
HEIGHT: 72 IN | HEART RATE: 64 BPM | SYSTOLIC BLOOD PRESSURE: 128 MMHG | BODY MASS INDEX: 37.27 KG/M2 | WEIGHT: 275.13 LBS | OXYGEN SATURATION: 96 % | DIASTOLIC BLOOD PRESSURE: 76 MMHG | RESPIRATION RATE: 18 BRPM

## 2022-03-08 DIAGNOSIS — E66.01 SEVERE OBESITY (BMI 35.0-39.9) WITH COMORBIDITY: ICD-10-CM

## 2022-03-08 DIAGNOSIS — R73.03 PREDIABETES: ICD-10-CM

## 2022-03-08 DIAGNOSIS — G62.9 POLYNEUROPATHY: ICD-10-CM

## 2022-03-08 DIAGNOSIS — G56.03 BILATERAL CARPAL TUNNEL SYNDROME: ICD-10-CM

## 2022-03-08 DIAGNOSIS — E53.8 B12 DEFICIENCY: ICD-10-CM

## 2022-03-08 DIAGNOSIS — E53.8 B12 DEFICIENCY: Primary | ICD-10-CM

## 2022-03-08 LAB — VIT B12 SERPL-MCNC: 285 PG/ML (ref 210–950)

## 2022-03-08 PROCEDURE — 99214 OFFICE O/P EST MOD 30 MIN: CPT | Mod: S$GLB,,, | Performed by: PSYCHIATRY & NEUROLOGY

## 2022-03-08 PROCEDURE — 3008F PR BODY MASS INDEX (BMI) DOCUMENTED: ICD-10-PCS | Mod: CPTII,S$GLB,, | Performed by: PSYCHIATRY & NEUROLOGY

## 2022-03-08 PROCEDURE — 3074F PR MOST RECENT SYSTOLIC BLOOD PRESSURE < 130 MM HG: ICD-10-PCS | Mod: CPTII,S$GLB,, | Performed by: PSYCHIATRY & NEUROLOGY

## 2022-03-08 PROCEDURE — 36415 COLL VENOUS BLD VENIPUNCTURE: CPT | Performed by: PSYCHIATRY & NEUROLOGY

## 2022-03-08 PROCEDURE — 1159F PR MEDICATION LIST DOCUMENTED IN MEDICAL RECORD: ICD-10-PCS | Mod: CPTII,S$GLB,, | Performed by: PSYCHIATRY & NEUROLOGY

## 2022-03-08 PROCEDURE — 99999 PR PBB SHADOW E&M-EST. PATIENT-LVL IV: ICD-10-PCS | Mod: PBBFAC,,, | Performed by: PSYCHIATRY & NEUROLOGY

## 2022-03-08 PROCEDURE — 1160F RVW MEDS BY RX/DR IN RCRD: CPT | Mod: CPTII,S$GLB,, | Performed by: PSYCHIATRY & NEUROLOGY

## 2022-03-08 PROCEDURE — 1160F PR REVIEW ALL MEDS BY PRESCRIBER/CLIN PHARMACIST DOCUMENTED: ICD-10-PCS | Mod: CPTII,S$GLB,, | Performed by: PSYCHIATRY & NEUROLOGY

## 2022-03-08 PROCEDURE — 3078F PR MOST RECENT DIASTOLIC BLOOD PRESSURE < 80 MM HG: ICD-10-PCS | Mod: CPTII,S$GLB,, | Performed by: PSYCHIATRY & NEUROLOGY

## 2022-03-08 PROCEDURE — 99999 PR PBB SHADOW E&M-EST. PATIENT-LVL IV: CPT | Mod: PBBFAC,,, | Performed by: PSYCHIATRY & NEUROLOGY

## 2022-03-08 PROCEDURE — 1159F MED LIST DOCD IN RCRD: CPT | Mod: CPTII,S$GLB,, | Performed by: PSYCHIATRY & NEUROLOGY

## 2022-03-08 PROCEDURE — 99214 PR OFFICE/OUTPT VISIT, EST, LEVL IV, 30-39 MIN: ICD-10-PCS | Mod: S$GLB,,, | Performed by: PSYCHIATRY & NEUROLOGY

## 2022-03-08 PROCEDURE — 3074F SYST BP LT 130 MM HG: CPT | Mod: CPTII,S$GLB,, | Performed by: PSYCHIATRY & NEUROLOGY

## 2022-03-08 PROCEDURE — 3078F DIAST BP <80 MM HG: CPT | Mod: CPTII,S$GLB,, | Performed by: PSYCHIATRY & NEUROLOGY

## 2022-03-08 PROCEDURE — 82607 VITAMIN B-12: CPT | Performed by: PSYCHIATRY & NEUROLOGY

## 2022-03-08 PROCEDURE — 3008F BODY MASS INDEX DOCD: CPT | Mod: CPTII,S$GLB,, | Performed by: PSYCHIATRY & NEUROLOGY

## 2022-03-08 RX ORDER — GABAPENTIN 300 MG/1
300 CAPSULE ORAL NIGHTLY
COMMUNITY

## 2022-03-08 RX ORDER — DOXYCYCLINE 100 MG/1
CAPSULE ORAL
COMMUNITY

## 2022-03-08 RX ORDER — MUPIROCIN 20 MG/G
OINTMENT TOPICAL
COMMUNITY

## 2022-03-08 RX ORDER — CLINDAMYCIN PHOSPHATE 11.9 MG/ML
SOLUTION TOPICAL
COMMUNITY

## 2022-03-08 RX ORDER — NITROGLYCERIN 0.4 MG/1
TABLET SUBLINGUAL
COMMUNITY

## 2022-03-08 NOTE — PROGRESS NOTES
HPI:  Killian Oleary II is a 65 y.o. male with  left thigh numbness. He also has longer history of numbness of the bilateral toes and longer standing bilateral hand numbness at night.     Patient is here for 6 months follow up    Lost 16 pounds since the last visit! Through diet and exercising through remodeling his home after [place]    Gabapentin was restarted by PCP due to increased pain in the feet and hands    He is tolerating this well and it seems to help    No rash    Mood is better    Thigh numbness is still resolved    Still no DM2 per PCP's labs per patient    CTS are not active  Not needing to wear brace- using topical THC which he feels helped    Continues B12 but not taking this frequently    Ortho still injecting knee PRN (needing this less)  Testosterone is used with PCP  Still seeing pulmonology for COPD/CARLOS          Review of Systems   Constitutional: Negative for fever.   HENT: Negative for nosebleeds.    Eyes: Negative for double vision.   Respiratory: Negative for hemoptysis.    Cardiovascular: Negative for leg swelling.   Genitourinary: Negative for hematuria.   Musculoskeletal: Negative for falls.   Skin: Negative for rash.   Neurological: Positive for sensory change.   Endo/Heme/Allergies: Does not bruise/bleed easily.   Psychiatric/Behavioral: The patient does not have insomnia.          I have reviewed all of this patient's past medical and surgical histories as well as family and social histories and active allergies and medications as documented in the electronic medical record.        Exam:  Gen Appearance, well developed/nourished in no apparent distress  CV: 2+ distal pulses with no edema or swelling  Neuro:  MS: Awake, alert, oriented to place, person, time, situation. Sustains attention. Recent/remote memory intact, Language is full to spontaneous speech/repetition/naming/comprehension. Fund of Knowledge is full  CN: Optic discs are flat with normal vasculature, PERRL, Extraoccular  movements and visual fields are full. Normal facial sensation and strength, Hearing symmetric, Tongue and Palate are midline and strong. Shoulder Shrug symmetric and strong.  Motor: Normal bulk, tone, no abnormal movements. 5/5 strength bilateral upper/lower extremities with 2+ reflexes in the arms and 1+ and no clonus  Sensory: symmetric to light touch, pain, temp, and vibration except reduced in the ankles to vibration. Romberg negative  Cerebellar: Finger-nose,Heal-shin, Rapid alternating movements intact  Gait: Normal stance, no ataxia      Labs: A1C 6.0 7/2018   Reviewed ESR, B12,Folate, and TSH from PCP in 2018. B12 was low normal   2018  CMP, CBC, SPEP/YESSY  Showed fasting glucose 131  Low T lab sent to PCP for review with patient    EMG 9/2018: Impression:  Abnormal Study secondary to the Presence of:    1. 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 Left Megalgia Paraesthetica   3. Mild Bilateral Carpal Tunnel Syndrome       Assessment/Plan: Killian Oleary II is a 66 y.o. male with  left thigh numbness. He also has longer history of numbness of the bilateral toes and longer standing bilateral hand numbness at night. 2018 EMG showed sensory and motor axonal polyneuropathy in the feet more than hands,  Left Megalgia Paraesthetica,   And Mild Bilateral Carpal Tunnel Syndrome  I recommend:     1. Continued Weight loss/ reduction of severe obesity needed for control of prediabetes/ impaired fasting. 2018 A1C was 6.0 and PCP continues to follow this- no DM to date per patient.   - Diet and mild exercise reviewed prior. He is loosing weight  2. B12 was low normal: Continue 1000mcg OTC B12 daily and follow-up B12 level now (he is not taking B12 consistently)  3. Stopped wearing belts reviewed prior  -gabapentin for thigh numbness/ pain was very helpful for neuropathy and thigh numbness but he had weight gain and possible rash/ mood side effects at higher dose. He is now tolerating 300mg  Gabapentin nightly per PCP for polyneuropathy and MP symptoms  -symptoms are currently iproved  4. Referred to Orthopedist for continued CTS symptoms despite brace wearing but was told to avoid surgery(states he was advised to have knee surgery instead- having injections PRN)  -Some CTS symptoms responding to topical THC. 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   6. CRALOS treated by  Pulmonology along with COPD    RTC  1 year
